# Patient Record
Sex: FEMALE | Race: WHITE | NOT HISPANIC OR LATINO
[De-identification: names, ages, dates, MRNs, and addresses within clinical notes are randomized per-mention and may not be internally consistent; named-entity substitution may affect disease eponyms.]

---

## 2019-02-10 ENCOUNTER — FORM ENCOUNTER (OUTPATIENT)
Age: 44
End: 2019-02-10

## 2019-03-24 ENCOUNTER — FORM ENCOUNTER (OUTPATIENT)
Age: 44
End: 2019-03-24

## 2019-05-13 ENCOUNTER — FORM ENCOUNTER (OUTPATIENT)
Age: 44
End: 2019-05-13

## 2019-06-02 ENCOUNTER — FORM ENCOUNTER (OUTPATIENT)
Age: 44
End: 2019-06-02

## 2020-05-17 ENCOUNTER — FORM ENCOUNTER (OUTPATIENT)
Age: 45
End: 2020-05-17

## 2020-11-11 ENCOUNTER — FORM ENCOUNTER (OUTPATIENT)
Age: 45
End: 2020-11-11

## 2020-11-16 ENCOUNTER — FORM ENCOUNTER (OUTPATIENT)
Age: 45
End: 2020-11-16

## 2020-11-22 ENCOUNTER — FORM ENCOUNTER (OUTPATIENT)
Age: 45
End: 2020-11-22

## 2021-06-24 PROBLEM — Z00.00 ENCOUNTER FOR PREVENTIVE HEALTH EXAMINATION: Status: ACTIVE | Noted: 2021-06-24

## 2021-06-28 ENCOUNTER — APPOINTMENT (OUTPATIENT)
Dept: OBGYN | Facility: CLINIC | Age: 46
End: 2021-06-28
Payer: MEDICAID

## 2021-06-28 VITALS
SYSTOLIC BLOOD PRESSURE: 120 MMHG | TEMPERATURE: 97.5 F | DIASTOLIC BLOOD PRESSURE: 72 MMHG | WEIGHT: 141 LBS | BODY MASS INDEX: 22.66 KG/M2 | HEIGHT: 66 IN

## 2021-06-28 DIAGNOSIS — Z78.9 OTHER SPECIFIED HEALTH STATUS: ICD-10-CM

## 2021-06-28 DIAGNOSIS — R87.611 ATYPICAL SQUAMOUS CELLS CANNOT EXCLUDE HIGH GRADE SQUAMOUS INTRAEPITHELIAL LESION ON CYTOLOGIC SMEAR OF CERVIX (ASC-H): ICD-10-CM

## 2021-06-28 DIAGNOSIS — N85.2 HYPERTROPHY OF UTERUS: ICD-10-CM

## 2021-06-28 DIAGNOSIS — Z86.59 PERSONAL HISTORY OF OTHER MENTAL AND BEHAVIORAL DISORDERS: ICD-10-CM

## 2021-06-28 PROCEDURE — 99213 OFFICE O/P EST LOW 20 MIN: CPT | Mod: 25

## 2021-06-28 PROCEDURE — 76830 TRANSVAGINAL US NON-OB: CPT

## 2021-06-28 NOTE — PROCEDURE
[Pelvic Pain] : pelvic pain [Abnormal Uterine Bleeding] : abnormal uterine bleeding [Suspected Ovarian Cyst] : suspected ovarian cyst [Transvaginal Ultrasound] : transvaginal ultrasound [FreeTextEntry3] : FREE FLUID\par RUPTURED CYST [FreeTextEntry5] : 118CC/  12MM [FreeTextEntry7] : 7CC [FreeTextEntry8] : 4.2CC

## 2021-06-28 NOTE — HISTORY OF PRESENT ILLNESS
[FreeTextEntry1] : previous visit\par 6 month gyn, 11-\par Performed by Kimo Nunez MD, OB/GYN, [phone]\par irregular periods\par SKIPPING MTHS\par \par sono nl\par \par Interpreted and discussed US, transvaginal results\par atypical squamous cells on cervical Papanicolaou smear cannot exclude high grade squamous intraepithelial lesion\par pap\par \par mammography assessment (Category 3) - Probably benign finding, short interval follow-up\par breast consult\par \par TODAY\par PELVIC PAIN

## 2021-07-04 LAB
C TRACH RRNA SPEC QL NAA+PROBE: NOT DETECTED
HPV HIGH+LOW RISK DNA PNL CVX: NOT DETECTED
N GONORRHOEA RRNA SPEC QL NAA+PROBE: NOT DETECTED
SOURCE AMPLIFICATION: NORMAL

## 2021-07-10 LAB — CYTOLOGY CVX/VAG DOC THIN PREP: NORMAL

## 2021-07-26 ENCOUNTER — NON-APPOINTMENT (OUTPATIENT)
Age: 46
End: 2021-07-26

## 2021-08-17 ENCOUNTER — NON-APPOINTMENT (OUTPATIENT)
Age: 46
End: 2021-08-17

## 2021-08-18 ENCOUNTER — NON-APPOINTMENT (OUTPATIENT)
Age: 46
End: 2021-08-18

## 2021-08-18 ENCOUNTER — APPOINTMENT (OUTPATIENT)
Dept: OBGYN | Facility: CLINIC | Age: 46
End: 2021-08-18
Payer: MEDICAID

## 2021-08-18 DIAGNOSIS — N85.9 NONINFLAMMATORY DISORDER OF UTERUS, UNSPECIFIED: ICD-10-CM

## 2021-08-18 PROCEDURE — 99442: CPT

## 2021-08-19 ENCOUNTER — NON-APPOINTMENT (OUTPATIENT)
Age: 46
End: 2021-08-19

## 2021-08-29 PROBLEM — N85.9 NONINFLAMMATORY DISORDER OF UTERUS: Status: ACTIVE | Noted: 2021-08-29

## 2021-09-10 ENCOUNTER — NON-APPOINTMENT (OUTPATIENT)
Age: 46
End: 2021-09-10

## 2021-09-13 ENCOUNTER — APPOINTMENT (OUTPATIENT)
Dept: OBGYN | Facility: CLINIC | Age: 46
End: 2021-09-13

## 2021-09-23 DIAGNOSIS — Z01.818 ENCOUNTER FOR OTHER PREPROCEDURAL EXAMINATION: ICD-10-CM

## 2021-10-05 ENCOUNTER — APPOINTMENT (OUTPATIENT)
Dept: OBGYN | Facility: CLINIC | Age: 46
End: 2021-10-05
Payer: MEDICAID

## 2021-10-05 VITALS
HEIGHT: 66 IN | HEART RATE: 74 BPM | DIASTOLIC BLOOD PRESSURE: 88 MMHG | TEMPERATURE: 90.7 F | WEIGHT: 148 LBS | SYSTOLIC BLOOD PRESSURE: 122 MMHG | BODY MASS INDEX: 23.78 KG/M2

## 2021-10-05 LAB
HCG UR QL: NEGATIVE
QUALITY CONTROL: YES

## 2021-10-05 PROCEDURE — 58555 HYSTEROSCOPY DX SEP PROC: CPT

## 2021-10-06 ENCOUNTER — NON-APPOINTMENT (OUTPATIENT)
Age: 46
End: 2021-10-06

## 2021-10-07 LAB — CORE LAB BIOPSY: NORMAL

## 2021-10-18 ENCOUNTER — APPOINTMENT (OUTPATIENT)
Dept: OBGYN | Facility: CLINIC | Age: 46
End: 2021-10-18
Payer: MEDICAID

## 2021-10-18 VITALS
DIASTOLIC BLOOD PRESSURE: 90 MMHG | SYSTOLIC BLOOD PRESSURE: 114 MMHG | WEIGHT: 148 LBS | HEIGHT: 66 IN | HEART RATE: 74 BPM | BODY MASS INDEX: 23.78 KG/M2

## 2021-10-18 PROCEDURE — 99213 OFFICE O/P EST LOW 20 MIN: CPT

## 2022-01-17 ENCOUNTER — APPOINTMENT (OUTPATIENT)
Dept: OBGYN | Facility: CLINIC | Age: 47
End: 2022-01-17

## 2022-02-14 DIAGNOSIS — R92.2 INCONCLUSIVE MAMMOGRAM: ICD-10-CM

## 2022-02-15 ENCOUNTER — NON-APPOINTMENT (OUTPATIENT)
Age: 47
End: 2022-02-15

## 2022-02-16 ENCOUNTER — NON-APPOINTMENT (OUTPATIENT)
Age: 47
End: 2022-02-16

## 2022-02-24 ENCOUNTER — NON-APPOINTMENT (OUTPATIENT)
Age: 47
End: 2022-02-24

## 2022-04-18 ENCOUNTER — APPOINTMENT (OUTPATIENT)
Dept: OBGYN | Facility: CLINIC | Age: 47
End: 2022-04-18
Payer: MEDICAID

## 2022-04-18 VITALS
WEIGHT: 145 LBS | SYSTOLIC BLOOD PRESSURE: 104 MMHG | BODY MASS INDEX: 23.3 KG/M2 | HEART RATE: 75 BPM | DIASTOLIC BLOOD PRESSURE: 80 MMHG | HEIGHT: 66 IN | TEMPERATURE: 97 F

## 2022-04-18 DIAGNOSIS — N92.6 IRREGULAR MENSTRUATION, UNSPECIFIED: ICD-10-CM

## 2022-04-18 PROCEDURE — 76830 TRANSVAGINAL US NON-OB: CPT

## 2022-04-18 PROCEDURE — 99396 PREV VISIT EST AGE 40-64: CPT | Mod: 25

## 2022-04-18 NOTE — PHYSICAL EXAM
[Chaperone Present] : A chaperone was present in the examining room during all aspects of the physical examination [Examination Of The Breasts] : a normal appearance [No Masses] : no breast masses were palpable [Vulvar Atrophy] : vulvar atrophy [Labia Majora] : normal [Labia Minora] : normal [Atrophy] : atrophy [Normal] : normal [Uterine Adnexae] : normal

## 2022-04-18 NOTE — PROCEDURE
[Transvaginal Ultrasound] : transvaginal ultrasound [FreeTextEntry3] : cervix normal\par no free fluid [FreeTextEntry5] : 42.5cc volume endo 2mm [FreeTextEntry7] : 2.5cc [FreeTextEntry8] : 2.0cc [FreeTextEntry4] : all ok\par observe

## 2022-04-22 LAB — HPV HIGH+LOW RISK DNA PNL CVX: NOT DETECTED

## 2022-04-29 LAB — CYTOLOGY CVX/VAG DOC THIN PREP: ABNORMAL

## 2022-11-21 ENCOUNTER — NON-APPOINTMENT (OUTPATIENT)
Age: 47
End: 2022-11-21

## 2022-12-12 ENCOUNTER — APPOINTMENT (OUTPATIENT)
Dept: OBGYN | Facility: CLINIC | Age: 47
End: 2022-12-12

## 2022-12-27 ENCOUNTER — APPOINTMENT (OUTPATIENT)
Dept: OBGYN | Facility: CLINIC | Age: 47
End: 2022-12-27

## 2022-12-27 ENCOUNTER — NON-APPOINTMENT (OUTPATIENT)
Age: 47
End: 2022-12-27

## 2022-12-27 VITALS
BODY MASS INDEX: 24.11 KG/M2 | HEIGHT: 66 IN | SYSTOLIC BLOOD PRESSURE: 124 MMHG | WEIGHT: 150 LBS | DIASTOLIC BLOOD PRESSURE: 76 MMHG | HEART RATE: 80 BPM

## 2022-12-27 DIAGNOSIS — N83.209 UNSPECIFIED OVARIAN CYST, UNSPECIFIED SIDE: ICD-10-CM

## 2022-12-27 PROCEDURE — 99213 OFFICE O/P EST LOW 20 MIN: CPT | Mod: 25

## 2022-12-27 PROCEDURE — 76830 TRANSVAGINAL US NON-OB: CPT

## 2022-12-27 NOTE — HISTORY OF PRESENT ILLNESS
[FreeTextEntry1] : bled today after 18 mths of amenorrhea\par gaining weight and had an outside sono which revealed an ovarian cyst on her left ovary [TextBox_28] : post menopausal bleeding

## 2022-12-27 NOTE — PROCEDURE
[Transvaginal Ultrasound] : transvaginal ultrasound [FreeTextEntry3] : cyst gone\par copious free fluid\par lining was 10 mm on sono, now it is 5 mm\par cervix normal\par actively menstruating [FreeTextEntry5] : 74.5 cc volume,  5 mm endometrium [FreeTextEntry7] : 2.6 cc [FreeTextEntry8] : 3.9 cc [FreeTextEntry4] : cyst gone

## 2023-02-03 ENCOUNTER — NON-APPOINTMENT (OUTPATIENT)
Age: 48
End: 2023-02-03

## 2023-02-03 DIAGNOSIS — Z12.39 ENCOUNTER FOR OTHER SCREENING FOR MALIGNANT NEOPLASM OF BREAST: ICD-10-CM

## 2023-02-27 ENCOUNTER — NON-APPOINTMENT (OUTPATIENT)
Age: 48
End: 2023-02-27

## 2023-04-24 ENCOUNTER — APPOINTMENT (OUTPATIENT)
Dept: OBGYN | Facility: CLINIC | Age: 48
End: 2023-04-24
Payer: MEDICAID

## 2023-04-24 VITALS
DIASTOLIC BLOOD PRESSURE: 82 MMHG | HEART RATE: 72 BPM | SYSTOLIC BLOOD PRESSURE: 118 MMHG | WEIGHT: 140 LBS | BODY MASS INDEX: 22.5 KG/M2 | HEIGHT: 66 IN

## 2023-04-24 PROCEDURE — 99396 PREV VISIT EST AGE 40-64: CPT | Mod: 25

## 2023-04-24 PROCEDURE — 76830 TRANSVAGINAL US NON-OB: CPT

## 2023-04-24 NOTE — PROCEDURE
[Abnormal Uterine Bleeding] : abnormal uterine bleeding [Transvaginal Ultrasound] : transvaginal ultrasound [FreeTextEntry3] : cervix normal\par no free fluid\par  [FreeTextEntry5] : 48.8 cc vol, 4 mm [FreeTextEntry7] : 2.2 cc [FreeTextEntry8] : 1.6 cc

## 2023-04-24 NOTE — HISTORY OF PRESENT ILLNESS
[FreeTextEntry1] : here for annual\par perimenopausal bleeding on and off\par last labs were perimenopause\par bled again in march 2023

## 2023-04-29 LAB
C TRACH RRNA SPEC QL NAA+PROBE: NOT DETECTED
CYTOLOGY CVX/VAG DOC THIN PREP: ABNORMAL
HPV HIGH+LOW RISK DNA PNL CVX: NOT DETECTED
N GONORRHOEA RRNA SPEC QL NAA+PROBE: NOT DETECTED
SOURCE AMPLIFICATION: NORMAL

## 2023-05-23 ENCOUNTER — NON-APPOINTMENT (OUTPATIENT)
Age: 48
End: 2023-05-23

## 2023-06-15 ENCOUNTER — NON-APPOINTMENT (OUTPATIENT)
Age: 48
End: 2023-06-15

## 2023-10-23 ENCOUNTER — APPOINTMENT (OUTPATIENT)
Dept: OBGYN | Facility: CLINIC | Age: 48
End: 2023-10-23
Payer: MEDICAID

## 2023-10-23 ENCOUNTER — TRANSCRIPTION ENCOUNTER (OUTPATIENT)
Age: 48
End: 2023-10-23

## 2023-10-23 VITALS
DIASTOLIC BLOOD PRESSURE: 80 MMHG | BODY MASS INDEX: 23.46 KG/M2 | WEIGHT: 146 LBS | HEIGHT: 66 IN | HEART RATE: 86 BPM | SYSTOLIC BLOOD PRESSURE: 123 MMHG

## 2023-10-23 DIAGNOSIS — Z87.891 PERSONAL HISTORY OF NICOTINE DEPENDENCE: ICD-10-CM

## 2023-10-23 DIAGNOSIS — N95.0 POSTMENOPAUSAL BLEEDING: ICD-10-CM

## 2023-10-23 PROCEDURE — 99213 OFFICE O/P EST LOW 20 MIN: CPT | Mod: 25

## 2024-02-28 NOTE — PROCEDURE
A catheter was exchanged for a (CATHETER 6FR 145D PGTL CRV 110CM 6 SH RADOPQ BRAID SUP TRQ) catheter. [Hysteroscopy] : Hysteroscopy [Time out performed] : Pre-procedure time out performed.  Patient's name, date of birth and procedure confirmed. [Consent Obtained] : Consent obtained [Abnormal uterine bleeding] : abnormal uterine bleeding [Benefits] : benefits [Alternatives] : alternatives [Patient] : patient [Infection] : infection [Bleeding] : bleeding [Sent to Pathology] : specimen was placed in buffered formalin and sent for pathology [Antibiotics given] : antibiotics not given [Hemostasis obtained] : hemostasis obtained [Tolerated Well] : Patient tolerated the procedure well [de-identified] : sounded to 7cm\par marissa dilatorsm\par polypoid tissue

## 2024-04-29 ENCOUNTER — APPOINTMENT (OUTPATIENT)
Dept: OBGYN | Facility: CLINIC | Age: 49
End: 2024-04-29
Payer: MEDICAID

## 2024-04-29 VITALS
HEART RATE: 74 BPM | SYSTOLIC BLOOD PRESSURE: 133 MMHG | DIASTOLIC BLOOD PRESSURE: 83 MMHG | HEIGHT: 66 IN | WEIGHT: 140 LBS | BODY MASS INDEX: 22.5 KG/M2

## 2024-04-29 DIAGNOSIS — N95.9 UNSPECIFIED MENOPAUSAL AND PERIMENOPAUSAL DISORDER: ICD-10-CM

## 2024-04-29 DIAGNOSIS — Z01.419 ENCOUNTER FOR GYNECOLOGICAL EXAMINATION (GENERAL) (ROUTINE) W/OUT ABNORMAL FINDINGS: ICD-10-CM

## 2024-04-29 DIAGNOSIS — C50.919 MALIGNANT NEOPLASM OF UNSPECIFIED SITE OF UNSPECIFIED FEMALE BREAST: ICD-10-CM

## 2024-04-29 DIAGNOSIS — N89.8 OTHER SPECIFIED NONINFLAMMATORY DISORDERS OF VAGINA: ICD-10-CM

## 2024-04-29 DIAGNOSIS — Z87.891 PERSONAL HISTORY OF NICOTINE DEPENDENCE: ICD-10-CM

## 2024-04-29 LAB
BILIRUB UR QL STRIP: NORMAL
CLARITY UR: CLEAR
COLLECTION METHOD: NORMAL
GLUCOSE UR-MCNC: NORMAL
HCG UR QL: 0.2 EU/DL
HGB UR QL STRIP.AUTO: NORMAL
KETONES UR-MCNC: NORMAL
LEUKOCYTE ESTERASE UR QL STRIP: ABNORMAL
NITRITE UR QL STRIP: NORMAL
PH UR STRIP: 5.5
PROT UR STRIP-MCNC: NORMAL
SP GR UR STRIP: 1.01

## 2024-04-29 PROCEDURE — 99396 PREV VISIT EST AGE 40-64: CPT

## 2024-04-29 PROCEDURE — 81003 URINALYSIS AUTO W/O SCOPE: CPT | Mod: QW

## 2024-04-29 PROCEDURE — 99459 PELVIC EXAMINATION: CPT

## 2024-04-29 RX ORDER — ESTRADIOL AND NORETHINDRONE ACETATE .5; .1 MG/1; MG/1
0.5-0.1 TABLET, FILM COATED ORAL DAILY
Qty: 3 | Refills: 3 | Status: COMPLETED | COMMUNITY
Start: 2023-06-15 | End: 2024-04-29

## 2024-04-29 RX ORDER — ESTRADIOL 0.1 MG/G
0.1 CREAM VAGINAL
Qty: 1 | Refills: 3 | Status: ACTIVE | COMMUNITY
Start: 2024-04-29 | End: 1900-01-01

## 2024-04-29 NOTE — HISTORY OF PRESENT ILLNESS
[FreeTextEntry1] : Here for her annual exam patient with recent weight gain since her menopause kicked in she is worried about taking HRT due to her sister having had hormonal breast ca...BRCA negativity

## 2024-04-29 NOTE — PHYSICAL EXAM
[Chaperone Present] : A chaperone was present in the examining room during all aspects of the physical examination [[id number]] : A chaperone was present during the pelvic exam. [Examination Of The Breasts] : a normal appearance [No Masses] : no breast masses were palpable [Labia Majora] : normal [Labia Minora] : normal [Normal] : normal [Uterine Adnexae] : normal

## 2024-05-01 LAB — HPV HIGH+LOW RISK DNA PNL CVX: NOT DETECTED

## 2024-05-04 LAB — CYTOLOGY CVX/VAG DOC THIN PREP: ABNORMAL

## 2024-10-28 ENCOUNTER — APPOINTMENT (OUTPATIENT)
Dept: OBGYN | Facility: CLINIC | Age: 49
End: 2024-10-28
Payer: MEDICAID

## 2024-10-28 VITALS
SYSTOLIC BLOOD PRESSURE: 130 MMHG | HEIGHT: 66 IN | DIASTOLIC BLOOD PRESSURE: 86 MMHG | BODY MASS INDEX: 24.11 KG/M2 | WEIGHT: 150 LBS | HEART RATE: 79 BPM

## 2024-10-28 DIAGNOSIS — C50.919 MALIGNANT NEOPLASM OF UNSPECIFIED SITE OF UNSPECIFIED FEMALE BREAST: ICD-10-CM

## 2024-10-28 DIAGNOSIS — N89.8 OTHER SPECIFIED NONINFLAMMATORY DISORDERS OF VAGINA: ICD-10-CM

## 2024-10-28 LAB
BILIRUB UR QL STRIP: NORMAL
CLARITY UR: CLEAR
COLLECTION METHOD: NORMAL
GLUCOSE UR-MCNC: NORMAL
HCG UR QL: 0.2 EU/DL
HGB UR QL STRIP.AUTO: NORMAL
KETONES UR-MCNC: NORMAL
LEUKOCYTE ESTERASE UR QL STRIP: ABNORMAL
NITRITE UR QL STRIP: NORMAL
PH UR STRIP: 6
PROT UR STRIP-MCNC: NORMAL
SP GR UR STRIP: 1.02

## 2024-10-28 PROCEDURE — 99459 PELVIC EXAMINATION: CPT

## 2024-10-28 PROCEDURE — 99213 OFFICE O/P EST LOW 20 MIN: CPT

## 2024-10-28 PROCEDURE — 81003 URINALYSIS AUTO W/O SCOPE: CPT | Mod: QW

## 2024-11-20 ENCOUNTER — NON-APPOINTMENT (OUTPATIENT)
Age: 49
End: 2024-11-20

## 2025-04-28 ENCOUNTER — APPOINTMENT (OUTPATIENT)
Dept: OBGYN | Facility: CLINIC | Age: 50
End: 2025-04-28
Payer: MEDICAID

## 2025-04-28 VITALS
BODY MASS INDEX: 24.11 KG/M2 | HEART RATE: 76 BPM | WEIGHT: 150 LBS | SYSTOLIC BLOOD PRESSURE: 115 MMHG | DIASTOLIC BLOOD PRESSURE: 81 MMHG | HEIGHT: 66 IN

## 2025-04-28 DIAGNOSIS — Z01.419 ENCOUNTER FOR GYNECOLOGICAL EXAMINATION (GENERAL) (ROUTINE) W/OUT ABNORMAL FINDINGS: ICD-10-CM

## 2025-04-28 DIAGNOSIS — C50.919 MALIGNANT NEOPLASM OF UNSPECIFIED SITE OF UNSPECIFIED FEMALE BREAST: ICD-10-CM

## 2025-04-28 DIAGNOSIS — N89.8 OTHER SPECIFIED NONINFLAMMATORY DISORDERS OF VAGINA: ICD-10-CM

## 2025-04-28 PROCEDURE — 99396 PREV VISIT EST AGE 40-64: CPT

## 2025-04-28 PROCEDURE — 99386 PREV VISIT NEW AGE 40-64: CPT

## 2025-04-28 PROCEDURE — 99459 PELVIC EXAMINATION: CPT

## 2025-04-28 RX ORDER — ESTRADIOL 0.1 MG/G
0.1 CREAM VAGINAL
Qty: 1 | Refills: 3 | Status: ACTIVE | COMMUNITY
Start: 2025-04-28 | End: 1900-01-01

## 2025-05-02 LAB — HPV HIGH+LOW RISK DNA PNL CVX: NOT DETECTED
